# Patient Record
(demographics unavailable — no encounter records)

---

## 2025-04-29 NOTE — ADDENDUM
[FreeTextEntry1] : This note was written by Maribel Rivas, acting as the  for Dr. Winter. This note accurately reflects the work and decisions made by Dr. Winter.

## 2025-04-29 NOTE — DISCUSSION/SUMMARY
[FreeTextEntry1] : EZRA is a 31 year female who presents for f/u on POP.   [] []   f/u All questions answered.

## 2025-04-29 NOTE — HISTORY OF PRESENT ILLNESS
[Vaginal Wall Prolapse] : no [Rectal Prolapse] : mild [Unable To Restrain Bowel Movement] : no [Urinary Frequency] : no [Feelings Of Urinary Urgency] : no [Urinary Frequency More Than Twice At Night (Nocturia)] : no nocturia [Urinary Tract Infection] : no [Constipation Obstructed Defecation] : mild [Pelvic Pain] : no [Vaginal Pain] : mild [] : years ago [FreeTextEntry1] : EZRA is a 31 year female who presents for f/u on POP. Last seen in office on 09/06/2024 with no bothersome prolapse symptoms, advised to continue PFE at home and can take magnesium/colace/miralax PRN.   UA w/ micro 06/06/2024 - WNL Negative Urine Cx 06/06/2024